# Patient Record
Sex: FEMALE | Race: WHITE | HISPANIC OR LATINO
[De-identification: names, ages, dates, MRNs, and addresses within clinical notes are randomized per-mention and may not be internally consistent; named-entity substitution may affect disease eponyms.]

---

## 2023-07-06 PROBLEM — Z00.00 ENCOUNTER FOR PREVENTIVE HEALTH EXAMINATION: Status: ACTIVE | Noted: 2023-07-06

## 2023-08-01 ENCOUNTER — NON-APPOINTMENT (OUTPATIENT)
Age: 83
End: 2023-08-01

## 2023-08-01 ENCOUNTER — APPOINTMENT (OUTPATIENT)
Dept: NEUROSURGERY | Facility: CLINIC | Age: 83
End: 2023-08-01
Payer: MEDICARE

## 2023-08-01 VITALS
BODY MASS INDEX: 23.92 KG/M2 | WEIGHT: 130 LBS | DIASTOLIC BLOOD PRESSURE: 78 MMHG | HEIGHT: 62 IN | OXYGEN SATURATION: 99 % | SYSTOLIC BLOOD PRESSURE: 133 MMHG | HEART RATE: 66 BPM

## 2023-08-01 DIAGNOSIS — G25.0 ESSENTIAL TREMOR: ICD-10-CM

## 2023-08-01 PROCEDURE — 99205 OFFICE O/P NEW HI 60 MIN: CPT

## 2023-08-01 RX ORDER — METFORMIN HYDROCHLORIDE 500 MG/1
500 TABLET, COATED ORAL
Refills: 0 | Status: ACTIVE | COMMUNITY

## 2023-08-01 RX ORDER — ATORVASTATIN CALCIUM 20 MG/1
20 TABLET, FILM COATED ORAL
Refills: 0 | Status: ACTIVE | COMMUNITY

## 2023-08-01 RX ORDER — PROPRANOLOL HYDROCHLORIDE 40 MG/1
40 TABLET ORAL
Refills: 0 | Status: ACTIVE | COMMUNITY

## 2023-08-07 NOTE — PHYSICAL EXAM
[General Appearance - Alert] : alert [General Appearance - In No Acute Distress] : in no acute distress [Oriented To Time, Place, And Person] : oriented to person, place, and time [Affect] : the affect was normal [Mood] : the mood was normal [Person] : oriented to person [Place] : oriented to place [Time] : oriented to time [Cranial Nerves Oculomotor (III)] : extraocular motion intact [Cranial Nerves Trigeminal (V)] : facial sensation intact symmetrically [Cranial Nerves Facial (VII)] : face symmetrical [Cranial Nerves Vestibulocochlear (VIII)] : hearing was intact bilaterally [Cranial Nerves Accessory (XI - Cranial And Spinal)] : head turning and shoulder shrug symmetric [Cranial Nerves Hypoglossal (XII)] : there was no tongue deviation with protrusion [Motor Handedness Right-Handed] : the patient is right hand dominant [Sensation Tactile Decrease] : light touch was intact [] : no respiratory distress [Respiration, Rhythm And Depth] : normal respiratory rhythm and effort [FreeTextEntry8] : no resting tremor, postural with arms extended:  0-1 cm right, none left, winged: 1 cm b/l, finger to nose: 3-5 cm right, 1-3 cm left. Tremor amplified holding mug and bringing to mouth with right hand. Severe tremor drawing spiral with right hand.  Unable to tandem gait, or tandem stance. Able to stand with feet together.

## 2023-08-07 NOTE — HISTORY OF PRESENT ILLNESS
[> 3 months] : more  than 3 months [FreeTextEntry1] : tremor [de-identified] : YOVANI HELLER is an 83 year old right handed female with PMH of severe essential tremor, DM2, HLD, hyperkalemia, HTN. She is not on anticoagulants/antiplatelets. She presents for neurosurgical consultation for HIFU. She last saw neurologist Dr. Hampton. She was diagnosed with ET at least 8 years ago, and it was mild at the time. It has worsened over time. She reports b/l hand tremor worse with activity R>L. She is taking propranolol 40 mg daily and doesn't feel much benefit from it. She also tried another medication which she couldn't tolerate, but she can't remember the name. Family history of tremor in mother, and brother. ADLs like eating, drinking, applying makeup are very difficult. She uses a straw when drinking. Reports she sways a little when walking, denies falls.

## 2023-08-07 NOTE — ASSESSMENT
[FreeTextEntry1] : IMPRESSION: 83F with PMH of ET, DM2, HLD, hyperkalemia, HTN. She presents for HIFU consult. She most recently saw neurologist Dr. Hampton. She was diagnosed with ET about 8 years ago. She has b/l hand tremor, R>L worse with activity. She is on propranolol 40 mg daily, with suboptimal tremor relief. Denies head, jaw, voice tremor. She has difficulty with ADLs like eating, drinking, and applying makeup. Her tremor is affecting her quality of life.   Discussed MRI guided focused ultrasound therapy including the procedure, risks, and benefits in office today.   I have discussed this patients symptoms with them. I have discussed how the symptoms of the patients essential tremor are disabling. Her bilateral tremor (R>L) significantly interferes with her quality of life. Would target the left VIM thalamus/DRT tract which would improve her right side tremor which is more bothersome to her. It would not improve or affect her left hand tremor. She has had inadequate control of her tremors for >6 months with the use of medication, which has also caused her disabling side effects that led her to not tolerate such high dose of medication.  During MRI guided focused ultrasound, a lesion is made in brain that is pathologically related to cause of tremor. If pathological track is targeted by creating lesion/burn in area along white matter tract, it will also treat tremor. Ventral intermediate nucleus of the thalamus is area that is targeted during procedure.  High intensity focused ultrasound is a procedure that occurs in an MRI magnet, where a series of MRIs are performed and the target is identified and ultrasound therapy is targeted at identified area. The head is completely shaved prior to a frame being placed. Then a rubber bag of circulating cool water is fitted to the head to prevent thermal injury to the scalp, and improve the interface between the transducer and the scalp. Subthreshold sonications are delivered so benefit and side effect can be assessed prior to creating a permanent lesion. Many series of assessments will be performed during the procedure. Lesions are irreversible. FDA approved for unilateral treatment. If this option chosen, side of the brain correlated with more bothersome symptoms, or dominant hand would be targeted. The other side can be treated 9-12 months later if the patient desires. This is procedure, not a surgery, not performed under anesthesia.  Side effects are possible including tingling around face on one side or around lips or hand. That may persist up to several months after procedure and can persist. May be faint to severe. May attenuate over time following procedure. Weakness possible in addition to paraesthesias. Side effect may mark over time. Very common to have unsteady gait after procedure. You will be discharged with a walker as a fall precaution. Efficacy of tremor suppression wanes after 1 year and attenuates over time. May need another procedure in the future.  Pt to consider treatment option. Next step would be CT head to determine SDR.  PLAN: Referral to movement disorder neurologist for evaluation and tremor grading.  CT head HIFU protocol to be done at [facility] to determine skull density ratio (SDR) and candidacy for HIFU. If SDR is favorable for treatment, pre-procedure 3T Siemens [place], Research magnet, MRI head without contrast, DBS protocol - [place] for trajectory and planning     I, Dr. Terrance Walls, personally performed the evaluation and management (E/M) services for this new patient.  That E/M includes conducting the clinically appropriate initial history &/or exam, assessing all conditions, and establishing the plan of care.  Today, my VALENTINE, [employer], was here to observe my evaluation and management service for this patient & follow plan of care established by me going forward.

## 2023-09-26 ENCOUNTER — APPOINTMENT (OUTPATIENT)
Dept: NEUROLOGY | Facility: CLINIC | Age: 83
End: 2023-09-26

## 2024-03-28 PROBLEM — Z12.11 COLON CANCER SCREENING: Status: ACTIVE | Noted: 2024-03-28

## 2024-03-28 PROBLEM — Z12.4 CERVICAL CANCER SCREENING: Status: ACTIVE | Noted: 2024-03-28

## 2024-04-04 ENCOUNTER — APPOINTMENT (OUTPATIENT)
Dept: OBGYN | Facility: CLINIC | Age: 84
End: 2024-04-04
Payer: MEDICARE

## 2024-04-04 VITALS
BODY MASS INDEX: 23.92 KG/M2 | HEART RATE: 67 BPM | SYSTOLIC BLOOD PRESSURE: 168 MMHG | WEIGHT: 130 LBS | DIASTOLIC BLOOD PRESSURE: 81 MMHG | HEIGHT: 62 IN

## 2024-04-04 DIAGNOSIS — Z12.39 ENCOUNTER FOR OTHER SCREENING FOR MALIGNANT NEOPLASM OF BREAST: ICD-10-CM

## 2024-04-04 DIAGNOSIS — Z12.4 ENCOUNTER FOR SCREENING FOR MALIGNANT NEOPLASM OF CERVIX: ICD-10-CM

## 2024-04-04 DIAGNOSIS — Z13.820 ENCOUNTER FOR SCREENING FOR OSTEOPOROSIS: ICD-10-CM

## 2024-04-04 DIAGNOSIS — Z12.11 ENCOUNTER FOR SCREENING FOR MALIGNANT NEOPLASM OF COLON: ICD-10-CM

## 2024-04-04 LAB
BILIRUB UR QL STRIP: NORMAL
CLARITY UR: CLEAR
COLLECTION METHOD: NORMAL
DATE COLLECTED: NORMAL
GLUCOSE UR-MCNC: NORMAL
HCG UR QL: 0.2 EU/DL
HEMOCCULT SP1 STL QL: NEGATIVE
HEMOGLOBIN: 12.9
HGB UR QL STRIP.AUTO: NORMAL
KETONES UR-MCNC: NORMAL
LEUKOCYTE ESTERASE UR QL STRIP: NORMAL
NITRITE UR QL STRIP: NORMAL
PH UR STRIP: 5.5
PROT UR STRIP-MCNC: NORMAL
QUALITY CONTROL: YES
SP GR UR STRIP: 1.03

## 2024-04-04 PROCEDURE — 82270 OCCULT BLOOD FECES: CPT

## 2024-04-04 PROCEDURE — 81003 URINALYSIS AUTO W/O SCOPE: CPT | Mod: QW

## 2024-04-04 PROCEDURE — G0101: CPT

## 2024-04-04 PROCEDURE — 85018 HEMOGLOBIN: CPT | Mod: QW

## 2024-04-04 RX ORDER — PATIROMER 16.8 G/1
16.8 POWDER, FOR SUSPENSION ORAL
Refills: 0 | Status: ACTIVE | COMMUNITY

## 2024-04-04 NOTE — HISTORY OF PRESENT ILLNESS
[TextBox_4] : 84 year old female presents for her annual visit. Denies GYN complaints at this time. PMH: supracervical hysterectomy abdominal sacrocoplexy with bladder lift (2009 with MD [place]), 2 vaginal deliveries, DM2, HTN, HLD.

## 2024-04-04 NOTE — PLAN
[FreeTextEntry1] : Patient to follow up in 1 year for annual GYN exam Mammogram due: now, rx given Colonoscopy due: per GI  Bone density due: now, rx given. she was previously having completed by her PCP but he passed away. She is looking to find a new internist in [place] where she lives. she recently stopped taking calcium supplement. Pap ordered Hemoccult ordered All questions answered, patient is agreeable with plan. PMB precautions reviewed vaginal lubricants PRN

## 2024-04-04 NOTE — PHYSICAL EXAM
[Appropriately responsive] : appropriately responsive [Alert] : alert [No Acute Distress] : no acute distress [No Lymphadenopathy] : no lymphadenopathy [Soft] : soft [Non-tender] : non-tender [Non-distended] : non-distended [No HSM] : No HSM [No Lesions] : no lesions [No Mass] : no mass [Oriented x3] : oriented x3 [Examination Of The Breasts] : a normal appearance [No Masses] : no breast masses were palpable [Vulvar Atrophy] : vulvar atrophy [Labia Majora] : normal [Labia Minora] : normal [Atrophy] : atrophy [Normal] : normal [Absent] : absent

## 2024-04-09 LAB — CYTOLOGY CVX/VAG DOC THIN PREP: NORMAL

## 2025-07-02 ENCOUNTER — APPOINTMENT (OUTPATIENT)
Dept: ORTHOPEDIC SURGERY | Facility: CLINIC | Age: 85
End: 2025-07-02
Payer: MEDICARE

## 2025-07-02 VITALS — BODY MASS INDEX: 23.92 KG/M2 | WEIGHT: 130 LBS | HEIGHT: 62 IN

## 2025-07-02 PROBLEM — Z86.39 HISTORY OF DIABETES MELLITUS: Status: ACTIVE | Noted: 2025-07-02

## 2025-07-02 PROBLEM — E11.9 DIABETES: Status: RESOLVED | Noted: 2025-07-02 | Resolved: 2025-07-02

## 2025-07-02 PROBLEM — M48.061 LUMBAR FORAMINAL STENOSIS: Status: ACTIVE | Noted: 2025-07-02

## 2025-07-02 PROBLEM — I10 HYPERTENSION: Status: RESOLVED | Noted: 2025-07-02 | Resolved: 2025-07-02

## 2025-07-02 PROBLEM — E78.00 HIGH CHOLESTEROL: Status: RESOLVED | Noted: 2025-07-02 | Resolved: 2025-07-02

## 2025-07-02 PROBLEM — M51.362 DEGENERATION OF INTERVERTEBRAL DISC OF LUMBAR REGION WITH DISCOGENIC BACK PAIN AND LOWER EXTREMITY PAIN: Status: ACTIVE | Noted: 2025-07-02

## 2025-07-02 PROBLEM — M54.16 LEFT LUMBAR RADICULOPATHY: Status: ACTIVE | Noted: 2025-07-02

## 2025-07-02 PROCEDURE — 72110 X-RAY EXAM L-2 SPINE 4/>VWS: CPT

## 2025-07-02 PROCEDURE — 99204 OFFICE O/P NEW MOD 45 MIN: CPT

## 2025-07-02 RX ORDER — MELOXICAM 15 MG/1
15 TABLET ORAL
Qty: 30 | Refills: 0 | Status: ACTIVE | COMMUNITY
Start: 2025-07-02 | End: 1900-01-01

## 2025-08-13 ENCOUNTER — APPOINTMENT (OUTPATIENT)
Dept: ORTHOPEDIC SURGERY | Facility: CLINIC | Age: 85
End: 2025-08-13